# Patient Record
Sex: FEMALE | Race: WHITE
[De-identification: names, ages, dates, MRNs, and addresses within clinical notes are randomized per-mention and may not be internally consistent; named-entity substitution may affect disease eponyms.]

---

## 2019-11-24 ENCOUNTER — HOSPITAL ENCOUNTER (EMERGENCY)
Dept: HOSPITAL 75 - ER | Age: 49
Discharge: HOME | End: 2019-11-24
Payer: COMMERCIAL

## 2019-11-24 VITALS — HEIGHT: 62.99 IN | BODY MASS INDEX: 43.32 KG/M2 | WEIGHT: 244.49 LBS

## 2019-11-24 VITALS — SYSTOLIC BLOOD PRESSURE: 142 MMHG | DIASTOLIC BLOOD PRESSURE: 90 MMHG
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
## 2019-11-24 DIAGNOSIS — R40.2362: ICD-10-CM

## 2019-11-24 DIAGNOSIS — S09.90XA: Primary | ICD-10-CM

## 2019-11-24 DIAGNOSIS — R40.2142: ICD-10-CM
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
## 2019-11-24 DIAGNOSIS — S16.1XXA: ICD-10-CM

## 2019-11-24 DIAGNOSIS — R40.2252: ICD-10-CM

## 2019-11-24 PROCEDURE — 72125 CT NECK SPINE W/O DYE: CPT

## 2019-11-24 PROCEDURE — 70450 CT HEAD/BRAIN W/O DYE: CPT

## 2019-11-24 NOTE — DIAGNOSTIC IMAGING REPORT
PROCEDURE: CT head and CT cervical spine without contrast.



TECHNIQUE: Multiple contiguous axial images were obtained through

the brain and cervical spine without the use of intravenous

contrast. Sagittal and coronal reformations through the cervical

spine were then performed. Auto Exposure Controls were utilized

during the CT exam to meet ALARA standards for radiation dose

reduction. 



INDICATION:  Head injury. Hit in forehead.



No comparison available



FINDINGS: There are no CT findings of an acute intracranial

abnormality. There is no evidence of intracranial hemorrhage.

There is no mass effect or shift. There is no hydrocephalus.

There is a small dystrophic calcification within the deep white

matter left frontal lobe. There is no territorial loss of

gray-white differentiation. Basilar cisterns patent. Posterior

fossa unremarkable.



A few minimally opacified air cells are within the inferior left

mastoid. The right mastoids are clear. The visualized paranasal

sinuses are clear. The visualized portion orbits unremarkable.

There is no calvarial fracture. No significant forehead soft

tissue hematoma evident



Cervical spine demonstrates a normal alignment. There is normal

alignment of the great cervical junction. There is normal

relationships of the lateral masses of C1 and C2. The facets are

normally aligned. There is no facet joint or disc space widening.

There are mild endplate changes and endplate spurs with no

high-grade canal or femoral stenosis. There is no acute cervical

spine fracture.



The lung apices are clear. The soft tissues of the neck

demonstrate no acute process.



IMPRESSION:

1. No CT evidence of an acute intracranial abnormality. There is

no calvarial fracture.

2. Mild degenerative features within the cervical spine without

acute fracture or traumatic malalignment. No high-grade canal or

foraminal stenosis demonstrated.



 



Dictated by: 



  Dictated on workstation # [id number]

## 2019-11-24 NOTE — ED HEAD INJURY
General


Stated Complaint:  HIT ON HEAD BY 20-30 LB BOX


Source:  patient


Exam Limitations:  no limitations





History of Present Illness


Date Seen by Provider:  2019


Time Seen by Provider:  10:54


Initial Comments


To ER with reports of head injury, this actually occurred yesterday while 

stacking boxes at [facility]. One of the boxes that she lifted knocked one of the 

heavier boxes off which fell and struck her on the right side of the forehead 

and the bridge of the nose. Did not lose consciousness, no anticoagulation use 

no dizziness no nausea but does have a persistent headache. She also has some 

pain in her neck specifically when she looks up to the right. No paresthesias.


Occurred:  yesterday


Severity:  moderate


Location:  frontal


Method of Injury:  direct blow


Loss of Consciousness:  no loss of consciousness


Associated Systoms:  Headaches





Allergies and Home Medications


Patient Home Medication List


Home Medication List Reviewed:  Yes





Review of Systems


Review of Systems


Constitutional:  see HPI


Eyes:  No Symptoms Reported


Ears, Nose, Mouth, Throat:  no symptoms reported


Respiratory:  no symptoms reported


Cardiovascular:  no symptoms reported


Genitourinary:  no symptoms reported


Musculoskeletal:  no symptoms reported


Skin:  no symptoms reported


Psychiatric/Neurological:  No Symptoms Reported





Past Medical-Social-Family Hx


Patient Social History


Recent Foreign Travel:  No


Contact w/Someone Who Travel:  No





Physical Exam


Vital Signs


Capillary Refill :


Height, Weight, BMI


Height: '"


Weight: lbs. oz. kg;  BMI


Method:


General Appearance:  WD/WN, no apparent distress


HEENT:  PERRL/EOMI, normal ENT inspection, TMs normal, other (to the area that 

was hit with a box there is no abrasion erythema ecchymosis or swelling)


Neck:  non-tender, full range of motion (pain when looking up into the right)


Respiratory:  no respiratory distress


Gastrointestinal:  normal bowel sounds, non tender, soft


Extremities:  normal range of motion, non-tender


Psychiatric:  alert, oriented x 3


Crainal Nerves:  normal hearing, normal speech, PERRL


Skin:  normal color, warm/dry





Radha Coma Score


Best Eye Response:  (4) Open Spontaneously


Best Verbal Response:  (5) Oriented


Best Motor Response:  (6) Obeys Commands


Durant Total:  15





Progress/Results/Core Measures


Results/Orders


My Orders





Orders - RIK BLANCO


Ct Head/Cervical Spine Wo (19 10:45)








Diagnostic Imaging





   Diagonstic Imaging:  CT


Comments


NAME:   BOBBI PAULSON


[facility] REC#:   [id number]


ACCOUNT#:   [id number]


PT STATUS:   REG ER


:   1970


PHYSICIAN:   RIK BLANCO


ADMIT DATE:   19/ER


                                   ***Draft***


POSDate of Exam:19





CT HEAD/CERVICAL SPINE WO








PROCEDURE: CT head and CT cervical spine without contrast.





TECHNIQUE: Multiple contiguous axial images were obtained through


the brain and cervical spine without the use of intravenous


contrast. Sagittal and coronal reformations through the cervical


spine were then performed. Auto Exposure Controls were utilized


during the CT exam to meet ALARA standards for radiation dose


reduction. 





INDICATION:  Head injury. Hit in forehead.





No comparison available





FINDINGS: There are no CT findings of an acute intracranial


abnormality. There is no evidence of intracranial hemorrhage.


There is no mass effect or shift. There is no hydrocephalus.


There is a small dystrophic calcification within the deep white


matter left frontal lobe. There is no territorial loss of


gray-white differentiation. Basilar cisterns patent. Posterior


fossa unremarkable.





A few minimally opacified air cells are within the inferior left


mastoid. The right mastoids are clear. The visualized paranasal


sinuses are clear. The visualized portion orbits unremarkable.


There is no calvarial fracture. No significant forehead soft


tissue hematoma evident





Cervical spine demonstrates a normal alignment. There is normal


alignment of the great cervical junction. There is normal


relationships of the lateral masses of C1 and C2. The facets are


normally aligned. There is no facet joint or disc space widening.


There are mild endplate changes and endplate spurs with no


high-grade canal or femoral stenosis. There is no acute cervical


spine fracture.





The lung apices are clear. The soft tissues of the neck


demonstrate no acute process.





IMPRESSION:


1. No CT evidence of an acute intracranial abnormality. There is


no calvarial fracture.


2. Mild degenerative features within the cervical spine without


acute fracture or traumatic malalignment. No high-grade canal or


foraminal stenosis demonstrated.





 





  Dictated on workstation # [id number]








Dict:   [id number] [id number]


Trans:   19 1122


TOPHER [id number]





Interpreted by:     MARCEL CONTRERAS MD


Electronically signed by:





Departure


Impression





   Primary Impression:  


   Minor head injury


   Qualified Codes:  S09.90XA - Unspecified injury of head, initial encounter


   Additional Impression:  


   Acute cervical myofascial strain


   Qualified Codes:  S16.1XXA - Strain of muscle, fascia and tendon at neck 

   level, initial encounter


Disposition:  01 HOME, SELF-CARE


Condition:  Stable





Departure-Patient Inst.


Decision time for Depature:  10:56


Referrals:  


NO,LOCAL PHYSICIAN (PCP)


Primary Care Physician


Patient Instructions:  Muscle Strain, Minor Head Injury





Add. Discharge Instructions:  


1. Return to ER for any concerns


2. Follow-up with your doctor next week


3. Tylenol and ibuprofen for pain control











RIK BLANCO             2019 10:57


POS